# Patient Record
Sex: MALE | Race: WHITE | URBAN - METROPOLITAN AREA
[De-identification: names, ages, dates, MRNs, and addresses within clinical notes are randomized per-mention and may not be internally consistent; named-entity substitution may affect disease eponyms.]

---

## 2018-12-07 ENCOUNTER — APPOINTMENT (RX ONLY)
Dept: URBAN - METROPOLITAN AREA CLINIC 101 | Facility: CLINIC | Age: 53
Setting detail: DERMATOLOGY
End: 2018-12-07

## 2018-12-07 DIAGNOSIS — B00.1 HERPESVIRAL VESICULAR DERMATITIS: ICD-10-CM

## 2018-12-07 DIAGNOSIS — K13.21 LEUKOPLAKIA OF ORAL MUCOSA, INCLUDING TONGUE: ICD-10-CM

## 2018-12-07 PROCEDURE — ? COUNSELING

## 2018-12-07 PROCEDURE — 99201: CPT

## 2018-12-07 PROCEDURE — ? PRESCRIPTION

## 2018-12-07 PROCEDURE — ? RECOMMENDATIONS

## 2018-12-07 PROCEDURE — ? OTHER

## 2018-12-07 RX ORDER — FLUOROURACIL 5 MG/G
CREAM TOPICAL
Qty: 1 | Refills: 0 | Status: ERX | COMMUNITY
Start: 2018-12-07

## 2018-12-07 RX ORDER — ACYCLOVIR 400 MG/1
TABLET ORAL
Qty: 21 | Refills: 0 | Status: ERX | COMMUNITY
Start: 2018-12-07

## 2018-12-07 RX ADMIN — FLUOROURACIL: 5 CREAM TOPICAL at 17:54

## 2018-12-07 RX ADMIN — ACYCLOVIR: 400 TABLET ORAL at 16:25

## 2018-12-07 ASSESSMENT — LOCATION ZONE DERM: LOCATION ZONE: LIP

## 2018-12-07 ASSESSMENT — LOCATION SIMPLE DESCRIPTION DERM: LOCATION SIMPLE: RIGHT LIP

## 2018-12-07 ASSESSMENT — LOCATION DETAILED DESCRIPTION DERM: LOCATION DETAILED: RIGHT INFERIOR VERMILION LIP

## 2018-12-07 NOTE — PROCEDURE: OTHER
Detail Level: Detailed
Note Text (......Xxx Chief Complaint.): This diagnosis correlates with the
Other (Free Text): PATIENT DENIES A HISTORY OF USING TOBACCO PRODUCTS. 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

## 2018-12-07 NOTE — PROCEDURE: RECOMMENDATIONS
Recommendations (Free Text): Zinc paste when in direct sunlight for extended periods of time.
Recommendation Preamble: The following recommendations were made during the visit:
Detail Level: Zone

## 2019-01-03 ENCOUNTER — APPOINTMENT (RX ONLY)
Dept: URBAN - METROPOLITAN AREA CLINIC 101 | Facility: CLINIC | Age: 54
Setting detail: DERMATOLOGY
End: 2019-01-03

## 2019-01-03 DIAGNOSIS — K13.21 LEUKOPLAKIA OF ORAL MUCOSA, INCLUDING TONGUE: ICD-10-CM

## 2019-01-03 PROCEDURE — 99213 OFFICE O/P EST LOW 20 MIN: CPT

## 2019-01-03 PROCEDURE — ? COUNSELING

## 2019-01-03 PROCEDURE — ? OTHER

## 2019-01-03 PROCEDURE — ? RECOMMENDATIONS

## 2019-01-03 ASSESSMENT — LOCATION DETAILED DESCRIPTION DERM: LOCATION DETAILED: RIGHT INFERIOR VERMILION LIP

## 2019-01-03 ASSESSMENT — LOCATION SIMPLE DESCRIPTION DERM: LOCATION SIMPLE: RIGHT LIP

## 2019-01-03 ASSESSMENT — LOCATION ZONE DERM: LOCATION ZONE: LIP

## 2019-01-03 NOTE — PROCEDURE: RECOMMENDATIONS
Detail Level: Zone
Recommendations (Free Text): Zinc paste when in direct sunlight for extended periods of time.
Recommendation Preamble: The following recommendations were made during the visit:

## 2019-01-03 NOTE — PROCEDURE: OTHER
Other (Free Text): PATIENT DENIES A HISTORY OF USING TOBACCO PRODUCTS. 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 Pt was prescribed 5FU last OV bid x 3 days. He has not started medication. He has the acyclovir tabs as well. \\n\\nInstructions were reviewed with the patient again, both verbally and in writing.
Detail Level: Detailed
Note Text (......Xxx Chief Complaint.): This diagnosis correlates with the